# Patient Record
Sex: FEMALE | URBAN - METROPOLITAN AREA
[De-identification: names, ages, dates, MRNs, and addresses within clinical notes are randomized per-mention and may not be internally consistent; named-entity substitution may affect disease eponyms.]

---

## 2024-06-17 ENCOUNTER — DOCUMENTATION ONLY (OUTPATIENT)
Dept: RADIOLOGY | Facility: HOSPITAL | Age: 29
End: 2024-06-17

## 2024-06-17 NOTE — PROGRESS NOTES
Patient no show for left breast ultrasound 05/21/2024. [facility] with call back number on 05/22, 06/14, and 06/17 to reschedule. No call back from patient as of this time. Faxed this documentation to ordering provider.

## 2024-09-27 DIAGNOSIS — M71.331 SYNOVIAL CYST OF RIGHT WRIST: Primary | ICD-10-CM

## 2024-11-25 NOTE — PROGRESS NOTES
"Subjective:    Patient ID: Missy Griffith is a right handed 29 y.o. female  who presented to [facility] for new visit..      Chief Complaint: Pain of the Right Wrist      History of Present Illness:    Missy Griffith  is a 29-year-old female who presents to the clinic today for right wrist pain that has been going on for about 1 year.  She has noticed a bump on the dorsal portion of her right wrist that comes and goes.  She does not report any pain or decreased range of motion in her wrist but does believe that this bump is causing some numbness and tingling for her entire right hand.  She has a native flick sign and denies dropping anything.  Does report that the numbness and tingling does radiate down her arm into her fingers.  Not taking anything for the pain just concerned about the bump and the numbness and tingling in her hand.  She is currently staying at home and not working.  She does use her hands extensively, cleaning and cooking and taking care of her children.      Hand Review of Systems:  Swelling?  no  Instability?  no  Clicking?  no  Limited ROM? no  Fever/Chills? no  Subluxation? no  Dislocation? no  Numbness/Tingling? yes  Weakness? no       Objective:      Physical Exam:    /61 (Patient Position: Sitting)   Pulse (!) 58   Temp 97.6 °F (36.4 °C)   Ht 5' 3" (1.6 m)   Wt 68.6 kg (151 lb 3.8 oz)   BMI 26.79 kg/m²     Ortho/SPM Exam    Appearance:  Soft tissue swelling: Left: no Right: no  Effusion: Left:  Negative Right: Negative  Erythema: Left no Right: no  Ecchymosis: Left: no Right: no  Atrophy: Left: no Right: no    Palpation:  Hand/wrist Tenderness: Left: none  Right: none    Range of motion:  Flexion (0-80): Left:  80 Right: 80  Extension (0-70): Left:  70 Right: 70  Ulnar deviation (0-30): 30 Right: 30  Radial deviation (0-20): 20 Right: 20  Supination (0-90): Left: 90 Right: 90  Pronation (0-90): Left: 90 Right: 90  Able to make a " power fist and claw hand: on Both hand(s)  Distal palmar crease-finger tip distance: 0 on Both hand(s)    Strength:  Flexion: Left: 5/5 Pain: no Right: 5/5 Pain: no  Extension: Left: 5/5 Pain: no Right: 5/5 Pain: no  Supination: Left: 5/5 Pain: no Right: 5/5 Pain: no  Pronation: Left: 5/5 Pain: no Right: 5/5 Pain: no  Ulnar deviation: Left: 5/5 Pain: no Right: 5/5 Pain: no  Radial deviation: Left: 5/5 Pain: no Right: 5/5 Pain: no    Special Tests:  Durkans Test (Carpal Compression test): Left: Negative  Right: Positive  Tinels:  Left: Negative  Right: Positive    Phalens: Left: Negative  Right: Negative    Gipsy Litler test:  Left: Negative  Right: Negative    UCL laxity: Left: Not performed  Right: Not performed  FDP test: Left: Negative  Right: Negative  FDS test: Left: Negative  Right: Negative  Reverse Phalens: Left: Not performed Right: Not performed  Finkelstein's Test: Left: Negative Right: Negative    Froments: Left: Not performed Right: Not performed  Shuck Test: Left: Not performed Right: Not performed  Spurling's test positive on the right  AIN/PIN/Ulnar nerve: Intact and symmetric    Neurovascular Exam  General appearance: NAD  Peripheral pulses: normal bilaterally   Reflexes: Left: Not performed Right: Not performed   Sensation: normal  Median,Radial,Ulnar, Anterior Interosseus    Labs:  Last A1c: The patient doesn't have any registry metric data available     Imaging:   Previous images reviewed.  X-rays ordered and performed today: yes  # of views: 3 Laterality: right  My Interpretation:  Distal Radial ulnar joint space is overall Normal on AP views. Scapholunate interval distance is Normal on right AP views. A DISI/VISI is not suggested on right hand series. Negative/positive ulnar variance is not suggested on lright lateral views.  no fracture, dislocation, swelling or degenerative changes noted   Bedside ultrasound shows well-circumscribed hypoechoic fluid pocket over the dorsum of her right  wrist.       Assessment:        Encounter Diagnoses   Code Name Primary?    M67.431 Ganglion cyst of dorsum of right wrist Yes    M54.12 Cervical radiculopathy         Plan:   MDM: Prior external referring provider notes reviewed. Prior external referring provider studies reviewed.   Dx:  Ganglion cyst on dorsum of right hand, mild.  Cervical radiculopathy on the right, mild  Treatment Plan: Discussed with patient diagnosis and treatment recommendations.   Natural history and expected course discussed. Questions answered.  Educational material distributed.  Plain film x-rays.  Home physical therapy exercise handouts provided to patient.   topical hot or cold therapy  Over the counter NSAID and/or tylenol provided you do not have contraindications such as but not limited to liver or kidney disease or uncontrolled blood pressure. If you're doctors have told you to to not take them based on your health, do not take them.   Exercises where patient's neck given to patient in Lithuanian.  Counseled that she is having mild cervical radiculopathy that should improve home exercises and topical medications.  We will leave the ganglion cyst alone unless it is causing her any further issue, if symptoms worsen, we will consider sending patient to orthopedic surgery for further evaluation.  We will also consider additional imaging if necessary.  Imaging: radiological studies ordered and independently reviewed; discussed with patient; agree with radiologist interpretation.   Procedure: Discussed CSI/VSI as treatment options; patient not a candidate for injections at this time.  Activity: Activity as tolerated  Therapy: No formal therapy  Medication: START over-the-counter NSAIDs (ibuprofen 200mg three tablets three times a day as needed)  START Voltaren Gel 1% as prescribed to affected area. Please see your primary care physician for further refills.  RTC: PRN; call if any issues.         Procedures    Rosendo Cleveland D.O.  Sports  Medicine Fellow

## 2024-11-26 ENCOUNTER — HOSPITAL ENCOUNTER (OUTPATIENT)
Dept: RADIOLOGY | Facility: HOSPITAL | Age: 29
Discharge: HOME | End: 2024-11-26
Attending: STUDENT IN AN ORGANIZED HEALTH CARE EDUCATION/TRAINING PROGRAM

## 2024-11-26 ENCOUNTER — OFFICE VISIT (OUTPATIENT)
Dept: ORTHOPEDICS | Facility: CLINIC | Age: 29
End: 2024-11-26

## 2024-11-26 VITALS
TEMPERATURE: 98 F | WEIGHT: 151.25 LBS | HEIGHT: 63 IN | BODY MASS INDEX: 26.8 KG/M2 | SYSTOLIC BLOOD PRESSURE: 104 MMHG | DIASTOLIC BLOOD PRESSURE: 61 MMHG | HEART RATE: 58 BPM

## 2024-11-26 DIAGNOSIS — M25.531 RIGHT WRIST PAIN: ICD-10-CM

## 2024-11-26 DIAGNOSIS — M54.12 CERVICAL RADICULOPATHY: ICD-10-CM

## 2024-11-26 DIAGNOSIS — M67.431 GANGLION CYST OF DORSUM OF RIGHT WRIST: Primary | ICD-10-CM

## 2024-11-26 PROCEDURE — 99213 OFFICE O/P EST LOW 20 MIN: CPT | Mod: PBBFAC,25 | Performed by: STUDENT IN AN ORGANIZED HEALTH CARE EDUCATION/TRAINING PROGRAM

## 2024-11-26 PROCEDURE — 73110 X-RAY EXAM OF WRIST: CPT | Mod: TC,RT

## 2024-11-26 RX ORDER — DICLOFENAC SODIUM 10 MG/G
2 GEL TOPICAL 4 TIMES DAILY
Qty: 100 G | Refills: 3 | Status: SHIPPED | OUTPATIENT
Start: 2024-11-26

## 2024-11-26 NOTE — PROGRESS NOTES
Faculty Attestation: Missy Griffith  was seen in [facility] Medicine Clinic Discussed with Dr. Cleveland at the time of the visit. History of Present Illness, Physical Exam, and Assessment and Plan reviewed.     Treatment plan is reasonable and appropriate. Compliance with treatment recommendations is important.      Radiology images independently reviewed and agree with fellow interpretation.     No procedure was performed.    Rolf Mclaughlin MD  Sports Medicine